# Patient Record
Sex: MALE | URBAN - METROPOLITAN AREA
[De-identification: names, ages, dates, MRNs, and addresses within clinical notes are randomized per-mention and may not be internally consistent; named-entity substitution may affect disease eponyms.]

---

## 2022-07-11 ENCOUNTER — OFFICE VISIT (OUTPATIENT)
Dept: URBAN - METROPOLITAN AREA CLINIC 75 | Facility: CLINIC | Age: 42
End: 2022-07-11

## 2022-07-11 DIAGNOSIS — T15.01XA FOREIGN BODY IN CORNEA, RIGHT EYE, INITIAL ENCOUNTER: Primary | ICD-10-CM

## 2022-07-11 PROCEDURE — 65222 REMOVE FOREIGN BODY FROM EYE: CPT | Performed by: OPTOMETRIST

## 2022-07-11 RX ORDER — ERYTHROMYCIN 5 MG/G
OINTMENT OPHTHALMIC
Qty: 5 | Refills: 0 | Status: ACTIVE
Start: 2022-07-11

## 2022-07-11 ASSESSMENT — INTRAOCULAR PRESSURE
OD: 14
OS: 18

## 2022-07-11 NOTE — IMPRESSION/PLAN
Impression: Foreign body in cornea, right eye, initial encounter: T15.01XA. Plan: Metal FB found OD. Took metal out of the eye. Rust ring also present. Sent Pred  RX to  pharmacy.  Pt to instill Pred Gtts QID OD

## 2022-07-14 ENCOUNTER — OFFICE VISIT (OUTPATIENT)
Dept: URBAN - METROPOLITAN AREA CLINIC 75 | Facility: CLINIC | Age: 42
End: 2022-07-14

## 2022-07-14 DIAGNOSIS — T15.01XD FOREIGN BODY IN CORNEA, RIGHT EYE, SUBSEQUENT ENCOUNTER: Primary | ICD-10-CM

## 2022-07-14 PROCEDURE — 65222 REMOVE FOREIGN BODY FROM EYE: CPT | Performed by: OPTOMETRIST

## 2022-07-14 PROCEDURE — 92071 CONTACT LENS FITTING FOR TX: CPT | Performed by: OPTOMETRIST

## 2022-07-14 ASSESSMENT — INTRAOCULAR PRESSURE
OD: 10
OS: 18

## 2022-07-14 NOTE — IMPRESSION/PLAN
Impression: Foreign body in cornea, right eye, subsequent encounter: T15.01xD. Plan: Rust ring found and removed w/ [facility]. 

Recommend patient wear bandage contact lens OD